# Patient Record
Sex: FEMALE | Race: WHITE | NOT HISPANIC OR LATINO | URBAN - METROPOLITAN AREA
[De-identification: names, ages, dates, MRNs, and addresses within clinical notes are randomized per-mention and may not be internally consistent; named-entity substitution may affect disease eponyms.]

---

## 2021-06-11 ENCOUNTER — APPOINTMENT (RX ONLY)
Dept: URBAN - METROPOLITAN AREA OTHER 5 | Facility: OTHER | Age: 36
Setting detail: DERMATOLOGY
End: 2021-06-11

## 2021-06-11 ENCOUNTER — APPOINTMENT (RX ONLY)
Dept: URBAN - METROPOLITAN AREA CLINIC 12 | Facility: CLINIC | Age: 36
Setting detail: DERMATOLOGY
End: 2021-06-11

## 2021-06-11 DIAGNOSIS — Z41.9 ENCOUNTER FOR PROCEDURE FOR PURPOSES OTHER THAN REMEDYING HEALTH STATE, UNSPECIFIED: ICD-10-CM

## 2021-06-11 PROCEDURE — ? COSMETIC CONSULTATION - KYBELLA

## 2021-06-11 PROCEDURE — ? LASER HAIR REMOVAL

## 2021-06-11 PROCEDURE — ? COSMETIC CONSULTATION: HAIR REMOVAL

## 2021-06-11 PROCEDURE — ? BOTOX

## 2021-06-11 PROCEDURE — ? TREATMENT REGIMEN

## 2021-06-11 ASSESSMENT — LOCATION DETAILED DESCRIPTION DERM
LOCATION DETAILED: LEFT SUPERIOR ANTERIOR NECK
LOCATION DETAILED: LEFT INFERIOR CENTRAL MALAR CHEEK
LOCATION DETAILED: RIGHT LATERAL BUCCAL CHEEK
LOCATION DETAILED: LEFT SUPERIOR VERMILION BORDER
LOCATION DETAILED: LEFT MENTAL CREASE

## 2021-06-11 ASSESSMENT — LOCATION SIMPLE DESCRIPTION DERM
LOCATION SIMPLE: CHIN
LOCATION SIMPLE: LEFT UPPER LIP
LOCATION SIMPLE: LEFT ANTERIOR NECK
LOCATION SIMPLE: LEFT CHEEK
LOCATION SIMPLE: RIGHT CHEEK

## 2021-06-11 ASSESSMENT — LOCATION ZONE DERM
LOCATION ZONE: FACE
LOCATION ZONE: LIP
LOCATION ZONE: NECK

## 2021-06-11 NOTE — PROCEDURE: LASER HAIR REMOVAL
Fluence: 20
Treatment Number: 0
Passes: 1
Price $ (Use Numbers Only, No Text Please.): 5249
Render Post-Care In The Note: No
Topical Gel?: yes
Pre-Procedure: Prior to proceeding the treatment areas were cleaned and all present put on their eye protection.
Fluence: 40
Skin Type: II
Pulse Duration: 35 ms
Laser Type: Nd:YAG 1064nm
Pulse Duration: 40 ms
Post-Procedure Care: Immediate endpoint: perifollicular erythema and edema. Stratamed
Consent: Written consent obtained, risks reviewed including but not limited to crusting, scabbing, blistering, scarring, darker or lighter pigmentary change, paradoxical hair regrowth, incomplete removal of hair and infection.
Spot Size: 10 mm
Post Treatment Product: Nubitymed
Post-Care Instructions: I reviewed with the patient in detail post-care instructions. Patient should avoid sun for a minimum of 4 weeks before and after treatment.
Detail Level: Detailed

## 2021-06-11 NOTE — PROCEDURE: BOTOX
Post-Care Instructions: Patient instructed to not lie down for 4 hours and limit physical activity for 24 hours. Patient instructed not to travel by airplane for 48 hours.
L Brow Units: 0
Show Additional Area 6: Yes
Additional Area 3 Location: mentalist
Show Lcl Units: No
Additional Area 6 Location: neck
Additional Area 2 Location: upper lip
Dilution (U/0.1 Cc): 4
Price (Use Numbers Only, No Special Characters Or $): 210
Additional Area 5 Location: scalp
Forehead Units: 6
Consent: Written consent obtained. Risks include but not limited to lid/brow ptosis, bruising, swelling, diplopia, temporary effect, incomplete chemical denervation.
Additional Area 1 Location: chin
Glabellar Complex Units: 8
Detail Level: Zone

## 2021-07-09 ENCOUNTER — APPOINTMENT (RX ONLY)
Dept: URBAN - METROPOLITAN AREA OTHER 5 | Facility: OTHER | Age: 36
Setting detail: DERMATOLOGY
End: 2021-07-09

## 2021-07-09 DIAGNOSIS — L65.0 TELOGEN EFFLUVIUM: ICD-10-CM

## 2021-07-09 DIAGNOSIS — Z41.9 ENCOUNTER FOR PROCEDURE FOR PURPOSES OTHER THAN REMEDYING HEALTH STATE, UNSPECIFIED: ICD-10-CM

## 2021-07-09 PROCEDURE — ? COUNSELING

## 2021-07-09 PROCEDURE — ? BOTOX (U OR CC)

## 2021-07-09 PROCEDURE — ? TREATMENT REGIMEN

## 2021-07-09 PROCEDURE — 99213 OFFICE O/P EST LOW 20 MIN: CPT

## 2021-07-09 ASSESSMENT — LOCATION SIMPLE DESCRIPTION DERM
LOCATION SIMPLE: SUPERIOR FOREHEAD
LOCATION SIMPLE: LEFT FOREHEAD
LOCATION SIMPLE: RIGHT FOREHEAD

## 2021-07-09 ASSESSMENT — LOCATION DETAILED DESCRIPTION DERM
LOCATION DETAILED: LEFT SUPERIOR FOREHEAD
LOCATION DETAILED: RIGHT SUPERIOR FOREHEAD
LOCATION DETAILED: SUPERIOR MID FOREHEAD

## 2021-07-09 ASSESSMENT — LOCATION ZONE DERM: LOCATION ZONE: FACE

## 2021-07-09 NOTE — PROCEDURE: BOTOX (U OR CC)
Detail Level: Zone
Consent: Written consent obtained. Risks include but not limited to lid/brow ptosis, bruising, swelling, diplopia, temporary effect, incomplete chemical denervation.
Additional Area 1 Units: 0
Document As Units Or Cc?: units
Additional Area 1 Location: Depressor Anguli Oris
Forehead Units/Cc: 3
Price (Use Numbers Only, No Special Characters Or $): 90
Post-Care Instructions: Patient instructed to not lie down for 4 hours and limit physical activity for 24 hours. Patient instructed not to travel by airplane for 48 hours.
Including Pricing Information In The Note: No
Lot #: [id number]
Expiration Date (Month Year): 12/2023
Additional Area 2 Location: frontal scalp
Quantity Per Injection Site (Units Or Cc): 3 U

## 2021-07-13 ENCOUNTER — APPOINTMENT (RX ONLY)
Dept: URBAN - METROPOLITAN AREA CLINIC 12 | Facility: CLINIC | Age: 36
Setting detail: DERMATOLOGY
End: 2021-07-13

## 2021-07-13 DIAGNOSIS — Z41.9 ENCOUNTER FOR PROCEDURE FOR PURPOSES OTHER THAN REMEDYING HEALTH STATE, UNSPECIFIED: ICD-10-CM

## 2021-07-13 PROCEDURE — ? LASER HAIR REMOVAL

## 2021-07-13 ASSESSMENT — LOCATION DETAILED DESCRIPTION DERM
LOCATION DETAILED: RIGHT SUPERIOR ANTERIOR NECK
LOCATION DETAILED: RIGHT CENTRAL BUCCAL CHEEK
LOCATION DETAILED: CENTRAL CHIN
LOCATION DETAILED: PHILTRUM
LOCATION DETAILED: LEFT SUPERIOR LATERAL BUCCAL CHEEK

## 2021-07-13 ASSESSMENT — LOCATION ZONE DERM
LOCATION ZONE: LIP
LOCATION ZONE: FACE
LOCATION ZONE: NECK

## 2021-07-13 ASSESSMENT — LOCATION SIMPLE DESCRIPTION DERM
LOCATION SIMPLE: UPPER LIP
LOCATION SIMPLE: RIGHT CHEEK
LOCATION SIMPLE: CHIN
LOCATION SIMPLE: LEFT CHEEK
LOCATION SIMPLE: RIGHT ANTERIOR NECK

## 2021-07-13 NOTE — PROCEDURE: LASER HAIR REMOVAL
# Of Treatments In Package: 0
Passes: 1
Spot Size: 10 mm
Pre-Procedure: Prior to proceeding the treatment areas were cleaned and all present put on their eye protection.
Pulse Duration: 40 ms
Topical Gel?: yes
Skin Type: III
Fluence: 20
Pulse Duration: 35 ms
Laser Type: Nd:YAG 1064nm
Fluence: 40
Post Treatment Product: Catervamed
Render Post-Care In The Note: No
Post-Procedure Care: Immediate endpoint: perifollicular erythema and edema. Stratamed
Post-Care Instructions: I reviewed with the patient in detail post-care instructions. Patient should avoid sun for a minimum of 4 weeks before and after treatment.
Detail Level: Detailed
Consent: Written consent obtained, risks reviewed including but not limited to crusting, scabbing, blistering, scarring, darker or lighter pigmentary change, paradoxical hair regrowth, incomplete removal of hair and infection.

## 2021-10-12 ENCOUNTER — APPOINTMENT (RX ONLY)
Dept: URBAN - METROPOLITAN AREA CLINIC 12 | Facility: CLINIC | Age: 36
Setting detail: DERMATOLOGY
End: 2021-10-12

## 2021-10-12 ENCOUNTER — APPOINTMENT (RX ONLY)
Dept: URBAN - METROPOLITAN AREA OTHER 5 | Facility: OTHER | Age: 36
Setting detail: DERMATOLOGY
End: 2021-10-12

## 2021-10-12 DIAGNOSIS — Z41.9 ENCOUNTER FOR PROCEDURE FOR PURPOSES OTHER THAN REMEDYING HEALTH STATE, UNSPECIFIED: ICD-10-CM

## 2021-10-12 DIAGNOSIS — L81.4 OTHER MELANIN HYPERPIGMENTATION: ICD-10-CM

## 2021-10-12 DIAGNOSIS — L82.1 OTHER SEBORRHEIC KERATOSIS: ICD-10-CM

## 2021-10-12 PROBLEM — D48.5 NEOPLASM OF UNCERTAIN BEHAVIOR OF SKIN: Status: ACTIVE | Noted: 2021-10-12

## 2021-10-12 PROCEDURE — 99213 OFFICE O/P EST LOW 20 MIN: CPT | Mod: 25

## 2021-10-12 PROCEDURE — 11102 TANGNTL BX SKIN SINGLE LES: CPT

## 2021-10-12 PROCEDURE — ? BIOPSY BY SHAVE METHOD

## 2021-10-12 PROCEDURE — ? LASER HAIR REMOVAL

## 2021-10-12 PROCEDURE — ? COUNSELING

## 2021-10-12 ASSESSMENT — LOCATION SIMPLE DESCRIPTION DERM
LOCATION SIMPLE: LEFT CHEEK
LOCATION SIMPLE: SUPERIOR FOREHEAD
LOCATION SIMPLE: RIGHT BREAST
LOCATION SIMPLE: CHIN
LOCATION SIMPLE: RIGHT CHEEK
LOCATION SIMPLE: RIGHT UPPER ARM
LOCATION SIMPLE: LEFT LIP

## 2021-10-12 ASSESSMENT — LOCATION DETAILED DESCRIPTION DERM
LOCATION DETAILED: LEFT SUPERIOR CENTRAL BUCCAL CHEEK
LOCATION DETAILED: RIGHT MEDIAL BREAST 4-5:00 REGION
LOCATION DETAILED: LEFT UPPER CUTANEOUS LIP
LOCATION DETAILED: RIGHT ANTERIOR DISTAL UPPER ARM
LOCATION DETAILED: LEFT CHIN
LOCATION DETAILED: SUPERIOR MID FOREHEAD
LOCATION DETAILED: RIGHT CENTRAL BUCCAL CHEEK

## 2021-10-12 ASSESSMENT — LOCATION ZONE DERM
LOCATION ZONE: FACE
LOCATION ZONE: TRUNK
LOCATION ZONE: LIP
LOCATION ZONE: ARM
LOCATION ZONE: FACE

## 2021-10-12 NOTE — PROCEDURE: LASER HAIR REMOVAL
Topical Anesthesia?: no
Passes: 1
Fluence: 20
Post-Procedure Care: Immediate endpoint: perifollicular erythema and edema. Stratamed
Post Treatment Product: Search Initiativesmed
Treatment Number: 0
Detail Level: Detailed
Fluence: 40
Pre-Procedure: Prior to proceeding the treatment areas were cleaned and all present put on their eye protection.
Pulse Duration: 35 ms
Laser Type: Nd:YAG 1064nm
Skin Type: II
Topical Gel?: yes
Pulse Duration: 40 ms
Spot Size: 10 mm
Consent: Written consent obtained, risks reviewed including but not limited to crusting, scabbing, blistering, scarring, darker or lighter pigmentary change, paradoxical hair regrowth, incomplete removal of hair and infection.
Post-Care Instructions: I reviewed with the patient in detail post-care instructions. Patient should avoid sun for a minimum of 4 weeks before and after treatment.

## 2021-10-12 NOTE — PROCEDURE: BIOPSY BY SHAVE METHOD
Detail Level: Detailed
Depth Of Biopsy: dermis
Was A Bandage Applied: Yes
Size Of Lesion In Cm: 0
Biopsy Type: H and E
Biopsy Method: Dermablade
Anesthesia Type: 1% lidocaine with 1:200,000 epinephrine
Anesthesia Volume In Cc: 0.5
Hemostasis: Aluminum Chloride
Wound Care: Petrolatum
Dressing: bandage
Destruction After The Procedure: No
Type Of Destruction Used: Curettage
Cryotherapy Text: The wound bed was treated with cryotherapy after the biopsy was performed.
Electrodesiccation Text: The wound bed was treated with electrodesiccation after the biopsy was performed.
Electrodesiccation And Curettage Text: The wound bed was treated with electrodesiccation and curettage after the biopsy was performed.
Silver Nitrate Text: The wound bed was treated with silver nitrate after the biopsy was performed.
Lab: [id number]
Lab Facility: [id number]
Consent: Written consent was obtained and risks were reviewed including but not limited to scarring, infection, bleeding, scabbing, incomplete removal, nerve damage and allergy to anesthesia.
Post-Care Instructions: I reviewed with the patient in detail post-care instructions. Patient is to keep the biopsy site dry for 24 hours, and then cleanse area with soap and water and petrolatum twice daily until healed. Patient may apply hydrogen peroxide soaks to remove any crusting.
Notification Instructions: Patient will be notified of biopsy results. However, patient instructed to call the office if not contacted within 2 weeks.
Billing Type: Third-Party Bill
Information: Selecting Yes will display possible errors in your note based on the variables you have selected. This validation is only offered as a suggestion for you. PLEASE NOTE THAT THE VALIDATION TEXT WILL BE REMOVED WHEN YOU FINALIZE YOUR NOTE. IF YOU WANT TO FAX A PRELIMINARY NOTE YOU WILL NEED TO TOGGLE THIS TO 'NO' IF YOU DO NOT WANT IT IN YOUR FAXED NOTE.

## 2021-11-09 ENCOUNTER — APPOINTMENT (RX ONLY)
Dept: URBAN - METROPOLITAN AREA CLINIC 12 | Facility: CLINIC | Age: 36
Setting detail: DERMATOLOGY
End: 2021-11-09

## 2021-11-09 DIAGNOSIS — Z41.9 ENCOUNTER FOR PROCEDURE FOR PURPOSES OTHER THAN REMEDYING HEALTH STATE, UNSPECIFIED: ICD-10-CM

## 2021-11-09 PROCEDURE — ? LASER HAIR REMOVAL

## 2021-11-09 ASSESSMENT — LOCATION SIMPLE DESCRIPTION DERM
LOCATION SIMPLE: LEFT CHEEK
LOCATION SIMPLE: CHIN
LOCATION SIMPLE: UPPER LIP
LOCATION SIMPLE: RIGHT CHEEK

## 2021-11-09 ASSESSMENT — LOCATION ZONE DERM
LOCATION ZONE: FACE
LOCATION ZONE: LIP

## 2021-11-09 ASSESSMENT — LOCATION DETAILED DESCRIPTION DERM
LOCATION DETAILED: PHILTRUM
LOCATION DETAILED: LEFT SUPERIOR CENTRAL BUCCAL CHEEK
LOCATION DETAILED: LEFT CHIN
LOCATION DETAILED: RIGHT CENTRAL BUCCAL CHEEK

## 2021-11-09 NOTE — PROCEDURE: LASER HAIR REMOVAL
Post-Procedure Care: Immediate endpoint: perifollicular erythema and edema. Stratamed
Post-Care Instructions: I reviewed with the patient in detail post-care instructions. Patient should avoid sun for a minimum of 4 weeks before and after treatment.
Topical Gel?: yes
# Of Treatments In Package: 0
Pulse Duration: 35 ms
Passes: 1
Detail Level: Detailed
Laser Type: Nd:YAG 1064nm
Post Treatment Product: Yieldrmed
Fluence: 20
Render Post-Care In The Note: No
Pulse Duration: 40 ms
Spot Size: 10 mm
Pre-Procedure: Prior to proceeding the treatment areas were cleaned and all present put on their eye protection.
Consent: Written consent obtained, risks reviewed including but not limited to crusting, scabbing, blistering, scarring, darker or lighter pigmentary change, paradoxical hair regrowth, incomplete removal of hair and infection.
Fluence: 40

## 2021-12-10 ENCOUNTER — APPOINTMENT (RX ONLY)
Dept: URBAN - METROPOLITAN AREA CLINIC 12 | Facility: CLINIC | Age: 36
Setting detail: DERMATOLOGY
End: 2021-12-10

## 2021-12-10 DIAGNOSIS — Z41.9 ENCOUNTER FOR PROCEDURE FOR PURPOSES OTHER THAN REMEDYING HEALTH STATE, UNSPECIFIED: ICD-10-CM

## 2021-12-10 PROCEDURE — ? LASER HAIR REMOVAL

## 2021-12-10 ASSESSMENT — LOCATION DETAILED DESCRIPTION DERM
LOCATION DETAILED: LEFT LATERAL BUCCAL CHEEK
LOCATION DETAILED: LEFT CHIN
LOCATION DETAILED: RIGHT CENTRAL BUCCAL CHEEK
LOCATION DETAILED: PHILTRUM

## 2021-12-10 ASSESSMENT — LOCATION SIMPLE DESCRIPTION DERM
LOCATION SIMPLE: RIGHT CHEEK
LOCATION SIMPLE: CHIN
LOCATION SIMPLE: UPPER LIP
LOCATION SIMPLE: LEFT CHEEK

## 2021-12-10 ASSESSMENT — LOCATION ZONE DERM
LOCATION ZONE: FACE
LOCATION ZONE: LIP

## 2022-01-10 ENCOUNTER — APPOINTMENT (RX ONLY)
Dept: URBAN - METROPOLITAN AREA CLINIC 12 | Facility: CLINIC | Age: 37
Setting detail: DERMATOLOGY
End: 2022-01-10

## 2022-01-10 DIAGNOSIS — Z41.9 ENCOUNTER FOR PROCEDURE FOR PURPOSES OTHER THAN REMEDYING HEALTH STATE, UNSPECIFIED: ICD-10-CM

## 2022-01-10 PROCEDURE — ? LASER HAIR REMOVAL

## 2022-01-10 ASSESSMENT — LOCATION DETAILED DESCRIPTION DERM
LOCATION DETAILED: RIGHT UPPER CUTANEOUS LIP
LOCATION DETAILED: LEFT CHIN
LOCATION DETAILED: RIGHT CENTRAL BUCCAL CHEEK
LOCATION DETAILED: LEFT LATERAL BUCCAL CHEEK

## 2022-01-10 ASSESSMENT — LOCATION SIMPLE DESCRIPTION DERM
LOCATION SIMPLE: RIGHT CHEEK
LOCATION SIMPLE: RIGHT LIP
LOCATION SIMPLE: LEFT CHEEK
LOCATION SIMPLE: CHIN

## 2022-01-10 ASSESSMENT — LOCATION ZONE DERM
LOCATION ZONE: LIP
LOCATION ZONE: FACE

## 2022-01-10 NOTE — PROCEDURE: LASER HAIR REMOVAL
Topical Anesthesia?: no
Fluence: 40
Treatment Number: 0
Passes: 1
Pre-Procedure: Prior to proceeding the treatment areas were cleaned and all present put on their eye protection.
Topical Gel?: yes
Skin Type: III
Post-Care Instructions: I reviewed with the patient in detail post-care instructions. Patient should avoid sun for a minimum of 4 weeks before and after treatment.
Fluence: 20
Consent: Written consent obtained, risks reviewed including but not limited to crusting, scabbing, blistering, scarring, darker or lighter pigmentary change, paradoxical hair regrowth, incomplete removal of hair and infection.
Post Treatment Product: CliniCastmed
Spot Size: 10 mm
Post-Procedure Care: Immediate endpoint: perifollicular erythema and edema. Stratamed
Pulse Duration: 40 ms
Detail Level: Detailed
Pulse Duration: 35 ms
Laser Type: Nd:YAG 1064nm

## 2022-02-08 ENCOUNTER — APPOINTMENT (RX ONLY)
Dept: URBAN - METROPOLITAN AREA CLINIC 12 | Facility: CLINIC | Age: 37
Setting detail: DERMATOLOGY
End: 2022-02-08

## 2022-02-08 DIAGNOSIS — Z41.9 ENCOUNTER FOR PROCEDURE FOR PURPOSES OTHER THAN REMEDYING HEALTH STATE, UNSPECIFIED: ICD-10-CM

## 2022-02-08 PROCEDURE — ? PATIENT SPECIFIC COUNSELING

## 2022-02-08 PROCEDURE — ? LASER HAIR REMOVAL

## 2022-02-08 PROCEDURE — ? CONSULTATION - LIPOSUCTION

## 2022-02-08 ASSESSMENT — LOCATION DETAILED DESCRIPTION DERM
LOCATION DETAILED: LEFT SUPERIOR LATERAL BUCCAL CHEEK
LOCATION DETAILED: RIGHT CENTRAL BUCCAL CHEEK
LOCATION DETAILED: LEFT CHIN
LOCATION DETAILED: PHILTRUM
LOCATION DETAILED: SUBMENTAL CHIN

## 2022-02-08 ASSESSMENT — LOCATION SIMPLE DESCRIPTION DERM
LOCATION SIMPLE: RIGHT CHEEK
LOCATION SIMPLE: LEFT CHEEK
LOCATION SIMPLE: UPPER LIP
LOCATION SIMPLE: SUBMENTAL CHIN
LOCATION SIMPLE: CHIN

## 2022-02-08 ASSESSMENT — LOCATION ZONE DERM
LOCATION ZONE: FACE
LOCATION ZONE: FACE
LOCATION ZONE: LIP

## 2022-02-08 NOTE — PROCEDURE: PATIENT SPECIFIC COUNSELING
Detail Level: Zone
Other (Free Text): Patient presents to clinic today for liposuction consultation. Patient states that she is dissatisfied with fat bulge located on her submental neck. Patient states that it is hereditary and that she has had family members who have had the procedure done. \\n\\Angela Vogt stepped in the room and evaluated the patient. Dr. Vogt advised patient is a great candidate for liposuction. Advised patient that the procedure will be performed in the office under local anesthesia. Also recommended facetite after the liposuction procedure to help with additional contraction in the skin. Patient verbalized understanding. \\n\\Anika stepped in to provide quote.

## 2022-02-08 NOTE — PROCEDURE: LASER HAIR REMOVAL
Detail Level: Detailed
Passes: 1
Fluence: 20
Consent: Written consent obtained, risks reviewed including but not limited to crusting, scabbing, blistering, scarring, darker or lighter pigmentary change, paradoxical hair regrowth, incomplete removal of hair and infection.
Treatment Number: 0
Post Treatment Product: BVfon Telecommunicationmed
Post-Care Instructions: I reviewed with the patient in detail post-care instructions. Patient should avoid sun for a minimum of 4 weeks before and after treatment.
Render Post-Care In The Note: No
Fluence: 40
Topical Gel?: yes
Skin Type: II
Pre-Procedure: Prior to proceeding the treatment areas were cleaned and all present put on their eye protection.
Laser Type: Nd:YAG 1064nm
Pulse Duration: 35 ms
Pulse Duration: 40 ms
Spot Size: 10 mm
Post-Procedure Care: Immediate endpoint: perifollicular erythema and edema. Stratamed

## 2022-02-08 NOTE — HPI: LIPOSUCTION CONSULTATION
How Severe Is It?: moderate
Is This A New Presentation, Or A Follow-Up?: Body Contour Deformity
Weight Measured In Kgs Or Lbs?: lbs

## 2022-02-21 ENCOUNTER — APPOINTMENT (RX ONLY)
Dept: URBAN - METROPOLITAN AREA CLINIC 12 | Facility: CLINIC | Age: 37
Setting detail: DERMATOLOGY
End: 2022-02-21

## 2022-02-21 DIAGNOSIS — Z41.9 ENCOUNTER FOR PROCEDURE FOR PURPOSES OTHER THAN REMEDYING HEALTH STATE, UNSPECIFIED: ICD-10-CM

## 2022-02-21 PROCEDURE — ? OTHER (COSMETIC)

## 2022-02-21 PROCEDURE — ? PRE-OP WORKLIST

## 2022-02-21 ASSESSMENT — LOCATION DETAILED DESCRIPTION DERM: LOCATION DETAILED: SUBMENTAL CHIN

## 2022-02-21 ASSESSMENT — LOCATION SIMPLE DESCRIPTION DERM: LOCATION SIMPLE: SUBMENTAL CHIN

## 2022-02-21 ASSESSMENT — LOCATION ZONE DERM: LOCATION ZONE: FACE

## 2022-02-21 NOTE — PROCEDURE: PRE-OP WORKLIST
Surgeon: Dr. Vogt
Detail Level: Simple
Surgery Scheduled: Liposuction of the submental neck
Admission Status: 23 hour observation
Date Of Surgery: 2/25/2022

## 2022-02-21 NOTE — PROCEDURE: OTHER (COSMETIC)
Detail Level: Zone
Price $ (Use Numbers Only, No Text Please.): 0.00
Sticky Other (Free Text): No charge pre op

## 2022-02-22 ENCOUNTER — RX ONLY (OUTPATIENT)
Age: 37
Setting detail: RX ONLY
End: 2022-02-22

## 2022-02-22 RX ORDER — DOXYCYCLINE HYCLATE 100 MG/1
TABLET, COATED ORAL
Qty: 7 | Refills: 0 | Status: ACTIVE

## 2022-02-25 ENCOUNTER — APPOINTMENT (RX ONLY)
Dept: URBAN - METROPOLITAN AREA CLINIC 12 | Facility: CLINIC | Age: 37
Setting detail: DERMATOLOGY
End: 2022-02-25

## 2022-02-25 DIAGNOSIS — Z41.9 ENCOUNTER FOR PROCEDURE FOR PURPOSES OTHER THAN REMEDYING HEALTH STATE, UNSPECIFIED: ICD-10-CM

## 2022-02-25 PROCEDURE — ? PATIENT SPECIFIC COUNSELING

## 2022-02-25 PROCEDURE — ? INMODE RFAL

## 2022-02-25 ASSESSMENT — LOCATION ZONE DERM: LOCATION ZONE: FACE

## 2022-02-25 ASSESSMENT — LOCATION DETAILED DESCRIPTION DERM: LOCATION DETAILED: SUBMENTAL CHIN

## 2022-02-25 ASSESSMENT — LOCATION SIMPLE DESCRIPTION DERM: LOCATION SIMPLE: SUBMENTAL CHIN

## 2022-02-25 NOTE — PROCEDURE: PATIENT SPECIFIC COUNSELING
Detail Level: Zone
Other (Free Text): 6-0 proline\\nRTC next Friday for suture removal \\nSee dr. Vogt following week

## 2022-02-25 NOTE — PROCEDURE: INMODE RFAL
Price (Use Numbers Only, No Special Characters Or $): 1364
Detail Level: Detailed
Was Liposuction Also Performed: Yes
Handpiece: Accutite
Total Aspirate In Ml: 230
Facetite Internal Cutoff Temperature: 50
Accutite External Cutoff Temperature: 42
Bodytite External Cutoff Temperature: 35
Accutite Internal Cutoff Temperature: 65
Treatment Number: 1
Total Kj Delivered: 0
Monitoring: Continuous cardiac monitoring and automated blood-pressure measurements every 30 minutes
Surgeon: Shailesh Vogt
Infiltration: Targeted areas scrubbed with chlorhexadine gluconate, rinsed with sterile saline, intradermal blebs of tumescent local anesthetic, percutaneous infiltration with selected infiltration cannulas as per standard tumescent technique using highly dilute tumescent anesthetic solution of lidocaine, epinephrine, and bicarbonate, specific concentration used in each area is defined in surgeons orders.
Adits: 2 mm: Placed within and on the periphery of the targeted areas.
Liposuction: Microcannular liposuction is accomplished using the standard technique with [employer] microcannnulas ([place] and Finesse types). The patient tolerated the procedure well. Immediately postoperatively, the treated areas were covered with super-absorbent pads and held in place with elastic compression garments.
Estimated Blood Loss (Cc): minimal
Consent: The risks and benefits of liposuction were discussed with the patient and include but are not limited to: infection, bruising, lumpiness, pain, anesthesia reaction, dysesthesia, scarring in treatment area or puncture point, vasovagal reactions, tachycardia, nausea, necrosis, ulceration, color change and asymmetry.
Dispo: Absorptive pads and garments applied. The patient was given post-op instructions and discharged home ambulatory and in good condition.
Post-Care Instructions: Patient should avoid heavy lifting or exercise for several weeks.

## 2022-03-04 ENCOUNTER — APPOINTMENT (RX ONLY)
Dept: URBAN - METROPOLITAN AREA CLINIC 12 | Facility: CLINIC | Age: 37
Setting detail: DERMATOLOGY
End: 2022-03-04

## 2022-03-04 PROBLEM — Z48.89 ENCOUNTER FOR OTHER SPECIFIED SURGICAL AFTERCARE: Status: ACTIVE | Noted: 2022-03-04

## 2022-03-04 PROCEDURE — 99024 POSTOP FOLLOW-UP VISIT: CPT

## 2022-03-04 PROCEDURE — ? POST-OP WOUND EVALUATION

## 2022-03-04 PROCEDURE — ? COUNSELING - POST-OP CARE

## 2022-03-04 NOTE — HPI: POST-OP VISIT
Is This A New Presentation, Or A Follow-Up?: Surgical Follow Up
Body Location Override (Optional): Post op liposuction with bodytite

## 2022-03-15 ENCOUNTER — APPOINTMENT (RX ONLY)
Dept: URBAN - METROPOLITAN AREA CLINIC 12 | Facility: CLINIC | Age: 37
Setting detail: DERMATOLOGY
End: 2022-03-15

## 2022-03-15 DIAGNOSIS — Z48.89 ENCOUNTER FOR OTHER SPECIFIED SURGICAL AFTERCARE: ICD-10-CM

## 2022-03-15 PROCEDURE — ? POST-OP CHECK

## 2022-03-15 PROCEDURE — ? COUNSELING - POST-OP CHECK

## 2022-03-15 PROCEDURE — ? PATIENT SPECIFIC COUNSELING

## 2022-03-15 PROCEDURE — 99024 POSTOP FOLLOW-UP VISIT: CPT

## 2022-03-15 ASSESSMENT — LOCATION DETAILED DESCRIPTION DERM
LOCATION DETAILED: LEFT LATERAL SUBMANDIBULAR CHEEK
LOCATION DETAILED: LEFT CENTRAL ANTERIOR NECK

## 2022-03-15 ASSESSMENT — LOCATION SIMPLE DESCRIPTION DERM
LOCATION SIMPLE: NECK
LOCATION SIMPLE: LEFT CHEEK

## 2022-03-15 ASSESSMENT — LOCATION ZONE DERM
LOCATION ZONE: NECK
LOCATION ZONE: FACE

## 2022-03-15 NOTE — PROCEDURE: PATIENT SPECIFIC COUNSELING
Detail Level: Zone
Other (Free Text): Patient presents s/p liposuction with FaceTite.performed 2/25/22 patient reports her neck feels bumpy and painful. \\n\\nDr. Nahai evaluated patient reassured that the lumpy bumpy feeling is all normal. Swelling is normal, the tingling sensation is caused by the sensory nerves regeneration. Recommended lymphatic massage, or us her electric tooth brush  2 min 3x a day. Reassured that she will see a difference by this Friday.

## 2022-04-12 ENCOUNTER — APPOINTMENT (RX ONLY)
Dept: URBAN - METROPOLITAN AREA CLINIC 12 | Facility: CLINIC | Age: 37
Setting detail: DERMATOLOGY
End: 2022-04-12

## 2022-04-12 DIAGNOSIS — Z48.89 ENCOUNTER FOR OTHER SPECIFIED SURGICAL AFTERCARE: ICD-10-CM

## 2022-04-12 PROCEDURE — ? COUNSELING - POST-OP CHECK

## 2022-04-12 PROCEDURE — ? POST-OP CHECK

## 2022-04-12 PROCEDURE — ? PATIENT SPECIFIC COUNSELING

## 2022-04-12 PROCEDURE — 99024 POSTOP FOLLOW-UP VISIT: CPT

## 2022-04-12 ASSESSMENT — LOCATION ZONE DERM
LOCATION ZONE: NECK
LOCATION ZONE: FACE

## 2022-04-12 ASSESSMENT — LOCATION SIMPLE DESCRIPTION DERM
LOCATION SIMPLE: LEFT CHEEK
LOCATION SIMPLE: NECK

## 2022-04-12 ASSESSMENT — LOCATION DETAILED DESCRIPTION DERM
LOCATION DETAILED: LEFT CENTRAL ANTERIOR NECK
LOCATION DETAILED: LEFT LATERAL SUBMANDIBULAR CHEEK

## 2022-04-12 NOTE — PROCEDURE: PATIENT SPECIFIC COUNSELING
Detail Level: Zone
Other (Free Text): Patient presents s/p liposuction with FaceTite.performed 2/25/22 patient reports no concerns with healing or pain.\\n\\nReassured that everything looks great, skin will continue to shrink over the next few months. Follow up in 2 months for final follow up. Patient verbalized understanding.

## 2022-11-14 ENCOUNTER — APPOINTMENT (RX ONLY)
Dept: URBAN - METROPOLITAN AREA CLINIC 12 | Facility: CLINIC | Age: 37
Setting detail: DERMATOLOGY
End: 2022-11-14

## 2022-11-14 DIAGNOSIS — E88.1 LIPODYSTROPHY, NOT ELSEWHERE CLASSIFIED: ICD-10-CM

## 2022-11-14 PROCEDURE — ? PATIENT SPECIFIC COUNSELING

## 2022-11-14 PROCEDURE — ? CONSULTATION - LIPOSUCTION

## 2022-11-14 ASSESSMENT — LOCATION DETAILED DESCRIPTION DERM
LOCATION DETAILED: RIGHT DISTAL POSTERIOR UPPER ARM
LOCATION DETAILED: RIGHT PROXIMAL POSTERIOR UPPER ARM
LOCATION DETAILED: LEFT PROXIMAL POSTERIOR UPPER ARM
LOCATION DETAILED: RIGHT ANTERIOR PROXIMAL THIGH
LOCATION DETAILED: PERIUMBILICAL SKIN
LOCATION DETAILED: LEFT ANTERIOR PROXIMAL THIGH
LOCATION DETAILED: LEFT ANTERIOR PROXIMAL THIGH
LOCATION DETAILED: LEFT DISTAL POSTERIOR UPPER ARM
LOCATION DETAILED: RIGHT ANTERIOR PROXIMAL THIGH

## 2022-11-14 ASSESSMENT — LOCATION SIMPLE DESCRIPTION DERM
LOCATION SIMPLE: ABDOMEN
LOCATION SIMPLE: LEFT POSTERIOR UPPER ARM
LOCATION SIMPLE: RIGHT THIGH
LOCATION SIMPLE: RIGHT POSTERIOR UPPER ARM
LOCATION SIMPLE: LEFT THIGH
LOCATION SIMPLE: LEFT POSTERIOR UPPER ARM
LOCATION SIMPLE: LEFT THIGH
LOCATION SIMPLE: RIGHT THIGH
LOCATION SIMPLE: RIGHT POSTERIOR UPPER ARM

## 2022-11-14 ASSESSMENT — LOCATION ZONE DERM
LOCATION ZONE: LEG
LOCATION ZONE: ARM
LOCATION ZONE: ARM
LOCATION ZONE: LEG
LOCATION ZONE: TRUNK

## 2022-11-14 NOTE — PROCEDURE: CONSULTATION - LIPOSUCTION
Send Procedure Quote As Charge: No
Detail Level: Detailed
Consultation Charge $ (Use Numbers Only, No Text Please.): 0.00

## 2022-11-14 NOTE — PROCEDURE: PATIENT SPECIFIC COUNSELING
Other (Free Text): Patient presents for consultation liposuction of her arms and inner thighs.\\n\\Angela Vogt evaluated and voiced \\n\\n- patient is an excellent candidate for liposuction on bilateral arms and inner thighs with bodytite. \\n- surgery in the Or \\n- will take 3 weeks for skin to retract \\n- discussed less invasive options. \\n- May return to work after a few days, however, patient will be sore. \\n- discussed wearing her garment for a few weeks. \\n- [facility] will call pt with quote.\\n\\nPatient added liposuction to her abdomen. Dr. Vogt informed patient that liposuction in that area will not render a great result as her skin will not retract well. Explained that tummy is the best option, however patient should wait until she’s had children.
Detail Level: Zone

## 2022-12-20 ENCOUNTER — APPOINTMENT (RX ONLY)
Dept: URBAN - METROPOLITAN AREA CLINIC 12 | Facility: CLINIC | Age: 37
Setting detail: DERMATOLOGY
End: 2022-12-20

## 2022-12-20 DIAGNOSIS — E88.1 LIPODYSTROPHY, NOT ELSEWHERE CLASSIFIED: ICD-10-CM

## 2022-12-20 PROCEDURE — ? PRE-OP WORKLIST

## 2022-12-20 PROCEDURE — ? ADDITIONAL NOTES

## 2022-12-20 PROCEDURE — ? OTHER (COSMETIC)

## 2022-12-20 ASSESSMENT — LOCATION SIMPLE DESCRIPTION DERM
LOCATION SIMPLE: RIGHT POSTERIOR UPPER ARM
LOCATION SIMPLE: LEFT THIGH
LOCATION SIMPLE: LEFT POSTERIOR UPPER ARM
LOCATION SIMPLE: RIGHT THIGH

## 2022-12-20 ASSESSMENT — LOCATION ZONE DERM
LOCATION ZONE: LEG
LOCATION ZONE: ARM

## 2022-12-20 ASSESSMENT — LOCATION DETAILED DESCRIPTION DERM
LOCATION DETAILED: LEFT ANTERIOR PROXIMAL THIGH
LOCATION DETAILED: RIGHT PROXIMAL POSTERIOR UPPER ARM
LOCATION DETAILED: LEFT PROXIMAL POSTERIOR UPPER ARM
LOCATION DETAILED: RIGHT ANTERIOR PROXIMAL THIGH

## 2022-12-20 NOTE — PROCEDURE: PRE-OP WORKLIST
Surgeon: Dr. Vogt
Detail Level: Simple
Photos Taken?: yes
Date Of Surgery: 1/12/2022
Surgery Scheduled: Smart Laser Liposuction to Bilateral Upper Arms and Inner Thighs

## 2022-12-20 NOTE — PROCEDURE: OTHER (COSMETIC)
Sticky Other (Free Text): Per Dr. Vogt No Charge
Other (Free Text): Pre-Op Appointment
Price $ (Use Numbers Only, No Text Please.): 0.00
Detail Level: Zone

## 2022-12-20 NOTE — HPI: PREOPERATIVE APPOINTMENT
Has The Patient Completed Informed Consent?: is scheduled to complete informed consent documentation during this visit
Has The Patient Fulfilled Financial Responsibilities For Surgical Scheduling?: is scheduled to complete payment to fulfill financial responsibilities during this visit
Have Arrangements And Instructions Been Made For Patient Transportation?: transportation arrangements have been made
Date Of Procedure?: 01/12/2022
Facility: [facility]
Surgeon: Dr. Vogt

## 2022-12-20 NOTE — PROCEDURE: ADDITIONAL NOTES
Additional Notes: Patient was fitted for 2XL Compression\\nAdvised to wear Spandex, or Tights fitting Leggings
Render Risk Assessment In Note?: no

## 2023-01-20 ENCOUNTER — APPOINTMENT (RX ONLY)
Dept: URBAN - METROPOLITAN AREA CLINIC 12 | Facility: CLINIC | Age: 38
Setting detail: DERMATOLOGY
End: 2023-01-20

## 2023-01-20 DIAGNOSIS — Z48.89 ENCOUNTER FOR OTHER SPECIFIED SURGICAL AFTERCARE: ICD-10-CM

## 2023-01-20 PROCEDURE — ? PATIENT SPECIFIC COUNSELING

## 2023-01-20 PROCEDURE — ? COUNSELING - POST-OP CHECK, LIPOSUCTION

## 2023-01-20 PROCEDURE — ? POST-OP CHECK

## 2023-01-20 PROCEDURE — 99024 POSTOP FOLLOW-UP VISIT: CPT

## 2023-01-20 ASSESSMENT — LOCATION DETAILED DESCRIPTION DERM
LOCATION DETAILED: LEFT DISTAL POSTERIOR UPPER ARM
LOCATION DETAILED: LEFT PROXIMAL POSTERIOR UPPER ARM
LOCATION DETAILED: LEFT ANTERIOR PROXIMAL THIGH
LOCATION DETAILED: RIGHT DISTAL POSTERIOR UPPER ARM
LOCATION DETAILED: LEFT PROXIMAL POSTERIOR UPPER ARM
LOCATION DETAILED: RIGHT PROXIMAL POSTERIOR UPPER ARM
LOCATION DETAILED: LEFT ANTERIOR PROXIMAL THIGH
LOCATION DETAILED: RIGHT ANTERIOR PROXIMAL THIGH
LOCATION DETAILED: RIGHT PROXIMAL POSTERIOR UPPER ARM
LOCATION DETAILED: RIGHT ANTERIOR PROXIMAL THIGH
LOCATION DETAILED: RIGHT ANTERIOR DISTAL THIGH

## 2023-01-20 ASSESSMENT — LOCATION SIMPLE DESCRIPTION DERM
LOCATION SIMPLE: RIGHT POSTERIOR UPPER ARM
LOCATION SIMPLE: RIGHT POSTERIOR UPPER ARM
LOCATION SIMPLE: LEFT POSTERIOR UPPER ARM
LOCATION SIMPLE: RIGHT THIGH
LOCATION SIMPLE: LEFT THIGH
LOCATION SIMPLE: LEFT POSTERIOR UPPER ARM
LOCATION SIMPLE: LEFT THIGH
LOCATION SIMPLE: RIGHT THIGH

## 2023-01-20 ASSESSMENT — LOCATION ZONE DERM
LOCATION ZONE: LEG
LOCATION ZONE: ARM
LOCATION ZONE: LEG
LOCATION ZONE: ARM

## 2023-01-20 NOTE — PROCEDURE: COUNSELING - POST-OP CHECK, LIPOSUCTION
Count Minor/Major Decisions Toward Mdm (Not Cosmetic)?: No
Add Postop Global No-Charge Code (05012)?: yes
Detail Level: Simple

## 2023-01-20 NOTE — PROCEDURE: PATIENT SPECIFIC COUNSELING
Other (Free Text): Patient presents s/p liposuction of the inner thighs and bilateral arms. Patient reports doing well, however patient is concerned about the longevity of bruising, and lumpiness. \\n\\Angela Vogt evaluated and voiced. Everything looks good, normal swelling and bruising. Recommended applying arnica cream into all the areas where there is still bruising, explained that this will help the bruising resolve faster.  Explained that her thighs are tighter then Dr. Vogt would like.  Advised to back off on her physical activity. Advised to place toppy foam inside her garment to help reduce swelling. Advised against traveling by plane until we can get the swelling under control. Patient verbalized understanding advised to follow up Wednesday
Detail Level: Simple

## 2023-01-25 ENCOUNTER — APPOINTMENT (RX ONLY)
Dept: URBAN - METROPOLITAN AREA CLINIC 12 | Facility: CLINIC | Age: 38
Setting detail: DERMATOLOGY
End: 2023-01-25

## 2023-01-25 DIAGNOSIS — Z48.89 ENCOUNTER FOR OTHER SPECIFIED SURGICAL AFTERCARE: ICD-10-CM

## 2023-01-25 PROCEDURE — ? COUNSELING - POST-OP CHECK, LIPOSUCTION

## 2023-01-25 PROCEDURE — ? POST-OP CHECK

## 2023-01-25 PROCEDURE — ? PATIENT SPECIFIC COUNSELING

## 2023-01-25 PROCEDURE — 99024 POSTOP FOLLOW-UP VISIT: CPT

## 2023-01-25 ASSESSMENT — LOCATION DETAILED DESCRIPTION DERM
LOCATION DETAILED: RIGHT PROXIMAL POSTERIOR UPPER ARM
LOCATION DETAILED: LEFT ANTERIOR PROXIMAL THIGH
LOCATION DETAILED: LEFT PROXIMAL POSTERIOR UPPER ARM
LOCATION DETAILED: RIGHT DISTAL POSTERIOR UPPER ARM
LOCATION DETAILED: RIGHT ANTERIOR PROXIMAL THIGH
LOCATION DETAILED: RIGHT PROXIMAL POSTERIOR UPPER ARM
LOCATION DETAILED: LEFT DISTAL POSTERIOR UPPER ARM
LOCATION DETAILED: LEFT ANTERIOR PROXIMAL THIGH
LOCATION DETAILED: RIGHT ANTERIOR DISTAL THIGH
LOCATION DETAILED: RIGHT ANTERIOR PROXIMAL THIGH
LOCATION DETAILED: LEFT PROXIMAL POSTERIOR UPPER ARM

## 2023-01-25 ASSESSMENT — LOCATION SIMPLE DESCRIPTION DERM
LOCATION SIMPLE: RIGHT POSTERIOR UPPER ARM
LOCATION SIMPLE: RIGHT THIGH
LOCATION SIMPLE: RIGHT POSTERIOR UPPER ARM
LOCATION SIMPLE: RIGHT THIGH
LOCATION SIMPLE: LEFT POSTERIOR UPPER ARM
LOCATION SIMPLE: LEFT THIGH
LOCATION SIMPLE: LEFT THIGH
LOCATION SIMPLE: LEFT POSTERIOR UPPER ARM

## 2023-01-25 ASSESSMENT — LOCATION ZONE DERM
LOCATION ZONE: LEG
LOCATION ZONE: ARM
LOCATION ZONE: LEG
LOCATION ZONE: ARM

## 2023-01-25 NOTE — PROCEDURE: COUNSELING - POST-OP CHECK, LIPOSUCTION
Count Minor/Major Decisions Toward Mdm (Not Cosmetic)?: No
Add Postop Global No-Charge Code (54849)?: yes
Detail Level: Simple

## 2023-01-25 NOTE — PROCEDURE: PATIENT SPECIFIC COUNSELING
Other (Free Text): Patient presents s/p liposuction of the inner thighs and bilateral arms. Patient reports doing well, however patient is still concerned about bruising and the hardness on her thighs \\n\\nDr. Sin evaluated and explained. The firmness is normal and will take a coup,e months to resolve. Reassured there is nothing bad going on, no signs of infection. Advised to keep applying arnica 3x a day. Patient is cleared to travel by plane. Advised to purchase compression sock to help prevent swelling. Advised patient to start massaging at home. Patient May start doing the things her body lets her do. Strongly advised to listen to her body. Continue wearing foam as much as she can stand it.
Detail Level: Simple

## 2023-02-07 ENCOUNTER — APPOINTMENT (RX ONLY)
Dept: URBAN - METROPOLITAN AREA CLINIC 12 | Facility: CLINIC | Age: 38
Setting detail: DERMATOLOGY
End: 2023-02-07

## 2023-02-07 DIAGNOSIS — Z48.89 ENCOUNTER FOR OTHER SPECIFIED SURGICAL AFTERCARE: ICD-10-CM

## 2023-02-07 PROCEDURE — ? COUNSELING - POST-OP CHECK, LIPOSUCTION

## 2023-02-07 PROCEDURE — 99024 POSTOP FOLLOW-UP VISIT: CPT

## 2023-02-07 PROCEDURE — ? POST-OP CHECK

## 2023-02-07 PROCEDURE — ? PATIENT SPECIFIC COUNSELING

## 2023-02-07 ASSESSMENT — LOCATION SIMPLE DESCRIPTION DERM
LOCATION SIMPLE: RIGHT POSTERIOR UPPER ARM
LOCATION SIMPLE: LEFT THIGH
LOCATION SIMPLE: RIGHT THIGH
LOCATION SIMPLE: LEFT POSTERIOR UPPER ARM
LOCATION SIMPLE: LEFT POSTERIOR UPPER ARM
LOCATION SIMPLE: RIGHT POSTERIOR UPPER ARM
LOCATION SIMPLE: LEFT THIGH
LOCATION SIMPLE: RIGHT THIGH

## 2023-02-07 ASSESSMENT — LOCATION ZONE DERM
LOCATION ZONE: LEG
LOCATION ZONE: ARM
LOCATION ZONE: ARM
LOCATION ZONE: LEG

## 2023-02-07 ASSESSMENT — LOCATION DETAILED DESCRIPTION DERM
LOCATION DETAILED: LEFT PROXIMAL POSTERIOR UPPER ARM
LOCATION DETAILED: RIGHT PROXIMAL POSTERIOR UPPER ARM
LOCATION DETAILED: LEFT ANTERIOR PROXIMAL THIGH
LOCATION DETAILED: RIGHT PROXIMAL POSTERIOR UPPER ARM
LOCATION DETAILED: RIGHT ANTERIOR PROXIMAL THIGH
LOCATION DETAILED: LEFT PROXIMAL POSTERIOR UPPER ARM
LOCATION DETAILED: LEFT DISTAL POSTERIOR UPPER ARM
LOCATION DETAILED: RIGHT ANTERIOR PROXIMAL THIGH
LOCATION DETAILED: RIGHT DISTAL POSTERIOR UPPER ARM
LOCATION DETAILED: LEFT ANTERIOR PROXIMAL THIGH
LOCATION DETAILED: RIGHT ANTERIOR DISTAL THIGH

## 2023-02-07 NOTE — PROCEDURE: PATIENT SPECIFIC COUNSELING
Other (Free Text): Patient presents s/p liposuction of the inner thighs and bilateral arms. C/o of bruising and an indentation on her arms denies any physical activity.\\n\\nDr. Sin evaluated all surgical sites, reassured that the swelling is normal, scars are fading well. Reassured that her legs will continue to keep shrinking. Advised to keep wearing the garments on her legs and continue lymphatic massage. Patient verbalized understanding. Advised to follow up in 2-3 weeks.
Detail Level: Simple

## 2023-02-07 NOTE — PROCEDURE: COUNSELING - POST-OP CHECK, LIPOSUCTION
Count Minor/Major Decisions Toward Mdm (Not Cosmetic)?: No
Add Postop Global No-Charge Code (03110)?: yes
Detail Level: Simple

## 2023-02-21 ENCOUNTER — APPOINTMENT (RX ONLY)
Dept: URBAN - METROPOLITAN AREA CLINIC 12 | Facility: CLINIC | Age: 38
Setting detail: DERMATOLOGY
End: 2023-02-21

## 2023-02-21 DIAGNOSIS — Z48.89 ENCOUNTER FOR OTHER SPECIFIED SURGICAL AFTERCARE: ICD-10-CM

## 2023-02-21 PROCEDURE — ? POST-OP CHECK

## 2023-02-21 PROCEDURE — ? PATIENT SPECIFIC COUNSELING

## 2023-02-21 PROCEDURE — ? COUNSELING - POST-OP CHECK, LIPOSUCTION

## 2023-02-21 PROCEDURE — 99024 POSTOP FOLLOW-UP VISIT: CPT

## 2023-02-21 ASSESSMENT — LOCATION DETAILED DESCRIPTION DERM
LOCATION DETAILED: RIGHT DISTAL POSTERIOR UPPER ARM
LOCATION DETAILED: LEFT PROXIMAL POSTERIOR UPPER ARM
LOCATION DETAILED: RIGHT PROXIMAL POSTERIOR UPPER ARM
LOCATION DETAILED: RIGHT ANTERIOR DISTAL THIGH
LOCATION DETAILED: LEFT ANTERIOR PROXIMAL THIGH
LOCATION DETAILED: LEFT PROXIMAL POSTERIOR UPPER ARM
LOCATION DETAILED: RIGHT ANTERIOR PROXIMAL THIGH
LOCATION DETAILED: LEFT ANTERIOR PROXIMAL THIGH
LOCATION DETAILED: RIGHT ANTERIOR PROXIMAL THIGH
LOCATION DETAILED: LEFT DISTAL POSTERIOR UPPER ARM
LOCATION DETAILED: RIGHT PROXIMAL POSTERIOR UPPER ARM

## 2023-02-21 ASSESSMENT — LOCATION ZONE DERM
LOCATION ZONE: LEG
LOCATION ZONE: LEG
LOCATION ZONE: ARM
LOCATION ZONE: ARM

## 2023-02-21 ASSESSMENT — LOCATION SIMPLE DESCRIPTION DERM
LOCATION SIMPLE: RIGHT THIGH
LOCATION SIMPLE: RIGHT POSTERIOR UPPER ARM
LOCATION SIMPLE: RIGHT THIGH
LOCATION SIMPLE: LEFT POSTERIOR UPPER ARM
LOCATION SIMPLE: LEFT THIGH
LOCATION SIMPLE: LEFT THIGH
LOCATION SIMPLE: RIGHT POSTERIOR UPPER ARM
LOCATION SIMPLE: LEFT POSTERIOR UPPER ARM

## 2023-02-21 NOTE — PROCEDURE: PATIENT SPECIFIC COUNSELING
Other (Free Text): Patient presents s/p liposuction of the inner thighs and bilateral arms. Patient voiced she has no concerns regarding the surgery would like to get clearance to go skiing. \\n\\Angela Vogt evaluated patient and voiced patient healed very well. Bruising has healed and incision site has minor rash, he voiced discontinue tapping patient verbalized understanding. He also voiced patient may go skiing and return to normal duties. Return in two months.
Detail Level: Simple

## 2023-02-21 NOTE — PROCEDURE: COUNSELING - POST-OP CHECK, LIPOSUCTION
Count Minor/Major Decisions Toward Mdm (Not Cosmetic)?: No
Add Postop Global No-Charge Code (53065)?: yes
Detail Level: Simple

## 2023-05-08 ENCOUNTER — APPOINTMENT (RX ONLY)
Dept: URBAN - METROPOLITAN AREA CLINIC 12 | Facility: CLINIC | Age: 38
Setting detail: DERMATOLOGY
End: 2023-05-08

## 2023-05-08 DIAGNOSIS — Z48.89 ENCOUNTER FOR OTHER SPECIFIED SURGICAL AFTERCARE: ICD-10-CM

## 2023-05-08 PROCEDURE — ? COUNSELING - POST-OP CHECK, LIPOSUCTION

## 2023-05-08 PROCEDURE — ? PATIENT SPECIFIC COUNSELING

## 2023-05-08 PROCEDURE — ? POST-OP CHECK

## 2023-05-08 PROCEDURE — 99024 POSTOP FOLLOW-UP VISIT: CPT

## 2023-05-08 ASSESSMENT — LOCATION SIMPLE DESCRIPTION DERM
LOCATION SIMPLE: RIGHT POSTERIOR UPPER ARM
LOCATION SIMPLE: LEFT POSTERIOR UPPER ARM
LOCATION SIMPLE: LEFT THIGH
LOCATION SIMPLE: RIGHT THIGH
LOCATION SIMPLE: LEFT THIGH
LOCATION SIMPLE: RIGHT POSTERIOR UPPER ARM
LOCATION SIMPLE: LEFT POSTERIOR UPPER ARM
LOCATION SIMPLE: RIGHT THIGH

## 2023-05-08 ASSESSMENT — LOCATION DETAILED DESCRIPTION DERM
LOCATION DETAILED: RIGHT ANTERIOR PROXIMAL THIGH
LOCATION DETAILED: RIGHT ANTERIOR PROXIMAL THIGH
LOCATION DETAILED: RIGHT ANTERIOR DISTAL THIGH
LOCATION DETAILED: RIGHT PROXIMAL POSTERIOR UPPER ARM
LOCATION DETAILED: LEFT PROXIMAL POSTERIOR UPPER ARM
LOCATION DETAILED: LEFT ANTERIOR PROXIMAL THIGH
LOCATION DETAILED: RIGHT DISTAL POSTERIOR UPPER ARM
LOCATION DETAILED: LEFT ANTERIOR PROXIMAL THIGH
LOCATION DETAILED: LEFT PROXIMAL POSTERIOR UPPER ARM
LOCATION DETAILED: RIGHT PROXIMAL POSTERIOR UPPER ARM
LOCATION DETAILED: LEFT DISTAL POSTERIOR UPPER ARM

## 2023-05-08 ASSESSMENT — LOCATION ZONE DERM
LOCATION ZONE: LEG
LOCATION ZONE: ARM
LOCATION ZONE: LEG
LOCATION ZONE: ARM

## 2023-05-08 NOTE — PROCEDURE: POST-OP CHECK
Detail Level: Simple
Add Postop Global No-Charge Code (25861)?: yes
Wound Evaluated By: Dr. Shailesh Vogt

## 2023-05-08 NOTE — PROCEDURE: COUNSELING - POST-OP CHECK, LIPOSUCTION
Count Minor/Major Decisions Toward Mdm (Not Cosmetic)?: No
Add Postop Global No-Charge Code (61801)?: yes
Detail Level: Simple

## 2023-05-08 NOTE — PROCEDURE: PATIENT SPECIFIC COUNSELING
Other (Free Text): Patient presents s/p liposuction of the inner thighs and bilateral arms. Patient reports she continues to do well postoperatively and voiced her right upper arm has extra skin hanging also her left thigh could use more liposuction. The patient voiced no other concerns at today visit. \\n\\nDr. Sin evaluated patient and voiced scars looks great almost faded away. He voiced to the patient he can touch up her left thing in office at no charge patient verbalized agreement. The patient was counseled on the in office procedure and [facility] will contact her to get it schedule. She was also counseled on BodyTite to help with the skin laxity of her right upper arm. Patient verbalized she will wait a year for that procedure as she is wanting to loose more weight. Return to clinic for minor liposuction of the left thigh.
Detail Level: Simple

## 2023-05-12 ENCOUNTER — APPOINTMENT (RX ONLY)
Dept: URBAN - METROPOLITAN AREA CLINIC 12 | Facility: CLINIC | Age: 38
Setting detail: DERMATOLOGY
End: 2023-05-12

## 2023-05-12 DIAGNOSIS — E88.1 LIPODYSTROPHY, NOT ELSEWHERE CLASSIFIED: ICD-10-CM

## 2023-05-12 PROCEDURE — ? LIPOSUCTION (PER AREA)

## 2023-05-12 ASSESSMENT — LOCATION DETAILED DESCRIPTION DERM
LOCATION DETAILED: RIGHT ANTERIOR PROXIMAL THIGH
LOCATION DETAILED: LEFT ANTERIOR PROXIMAL THIGH

## 2023-05-12 ASSESSMENT — LOCATION ZONE DERM: LOCATION ZONE: LEG

## 2023-05-12 ASSESSMENT — LOCATION SIMPLE DESCRIPTION DERM
LOCATION SIMPLE: LEFT THIGH
LOCATION SIMPLE: RIGHT THIGH

## 2023-05-12 NOTE — PROCEDURE: LIPOSUCTION (PER AREA)
Anesthesia Volume In Cc: 6
Consent: The risks, benefits, expectations and alternatives of liposuction were discussed and include but are not limited to: infection, bruising, lumpiness, pain, anesthesia reaction, dysesthesia, scarring in treatment area or puncture point, vasovagal reactions, tachycardia, nausea, necrosis, ulceration, color change and asymmetry.
Price $ (Use Numbers Only, No Text Please.): 0.00
Surgeon: Dr. Lamar Vogt
Detail Level: Detailed
Positioning: The patient was brought to the operating room and placed in the standard supine position in the usual manner. After placement of monitors, anesthesia was achieved as above uneventfully.
Estimated Blood Loss (Cc): minimal
Monitoring: Full continuous cardiac monitoring and automated blood-pressure measurements were performed per protocol.
Tumescent Anesthesia Volume In Cc (This Location): 100
Pre-Tunneling Performed?: Yes
Pre-Tunneling Description: Pretunneling was performed bluntly prior to infiltration of wetting solution.
Infiltration: Wetting solution was infiltrated into the treatment area using infiltration cannulae, distributing the solution evenly until satisfactory tissue turgor was achieved.
Liposuction was performed with a fanning technique, being careful to avoid oversuctioning any regions and protecting against end-hits.
Volume Of Total Aspirate In Cc: 150

## 2023-05-22 ENCOUNTER — APPOINTMENT (RX ONLY)
Dept: URBAN - METROPOLITAN AREA CLINIC 12 | Facility: CLINIC | Age: 38
Setting detail: DERMATOLOGY
End: 2023-05-22

## 2023-05-22 DIAGNOSIS — Z48.89 ENCOUNTER FOR OTHER SPECIFIED SURGICAL AFTERCARE: ICD-10-CM

## 2023-05-22 PROCEDURE — ? COUNSELING - POST-OP CHECK

## 2023-05-22 PROCEDURE — ? PATIENT SPECIFIC COUNSELING

## 2023-05-22 PROCEDURE — ? POST-OP CHECK

## 2023-05-22 PROCEDURE — 99024 POSTOP FOLLOW-UP VISIT: CPT

## 2023-05-22 ASSESSMENT — LOCATION SIMPLE DESCRIPTION DERM
LOCATION SIMPLE: RIGHT THIGH
LOCATION SIMPLE: LEFT THIGH

## 2023-05-22 ASSESSMENT — LOCATION DETAILED DESCRIPTION DERM
LOCATION DETAILED: RIGHT ANTERIOR PROXIMAL THIGH
LOCATION DETAILED: LEFT ANTERIOR PROXIMAL THIGH

## 2023-05-22 ASSESSMENT — LOCATION ZONE DERM: LOCATION ZONE: LEG

## 2023-05-22 NOTE — PROCEDURE: PATIENT SPECIFIC COUNSELING
Detail Level: Simple
Other (Free Text): 38 year old patient presents today for f/up for \\n\\n\\nDr. Sin examined and voiced that the area

## 2025-01-06 ENCOUNTER — APPOINTMENT (OUTPATIENT)
Dept: URBAN - METROPOLITAN AREA OTHER 5 | Facility: OTHER | Age: 40
Setting detail: DERMATOLOGY
End: 2025-01-06

## 2025-01-06 DIAGNOSIS — Z41.1 ENCOUNTER FOR COSMETIC SURGERY: ICD-10-CM

## 2025-01-06 PROCEDURE — ? CONSULTATION - THIGH LIFT

## 2025-01-06 PROCEDURE — ? PATIENT SPECIFIC COUNSELING

## 2025-01-06 PROCEDURE — ?

## 2025-01-06 ASSESSMENT — LOCATION DETAILED DESCRIPTION DERM
LOCATION DETAILED: LEFT ANTERIOR PROXIMAL THIGH
LOCATION DETAILED: RIGHT LABIUM MAJUS
LOCATION DETAILED: LEFT LABIUM MAJUS
LOCATION DETAILED: RIGHT ANTERIOR MEDIAL PROXIMAL THIGH

## 2025-01-06 ASSESSMENT — LOCATION SIMPLE DESCRIPTION DERM
LOCATION SIMPLE: LEFT THIGH
LOCATION SIMPLE: RIGHT THIGH
LOCATION SIMPLE: LABIA MAJORA

## 2025-01-06 ASSESSMENT — LOCATION ZONE DERM
LOCATION ZONE: LEG
LOCATION ZONE: VULVA

## 2025-01-06 NOTE — PROCEDURE: PATIENT SPECIFIC COUNSELING
Detail Level: Zone
Other (Free Text): Patient present for inner thighs and her labia asymmetry. \\nPatient voiced she noted bumps on her inner thighs after the inner liposuction she had done with  on 1/12/23. \\nLeft size is larger than the right side on her labia

## 2025-01-06 NOTE — HPI: LABIA (AESTHETIC DEFORMITY, LABIA)
How Severe Is It?: moderate
Is This A New Presentation, Or A Follow-Up?: Labial Aesthetic Deformity
When Was Your Last Menstrual Period?: 12/31/2024